# Patient Record
Sex: FEMALE | Race: WHITE
[De-identification: names, ages, dates, MRNs, and addresses within clinical notes are randomized per-mention and may not be internally consistent; named-entity substitution may affect disease eponyms.]

---

## 2017-11-24 ENCOUNTER — HOSPITAL ENCOUNTER (EMERGENCY)
Dept: HOSPITAL 75 - ER | Age: 82
Discharge: HOME | End: 2017-11-24
Payer: MEDICARE

## 2017-11-24 VITALS — BODY MASS INDEX: 25.71 KG/M2 | HEIGHT: 66 IN | WEIGHT: 160 LBS

## 2017-11-24 VITALS — SYSTOLIC BLOOD PRESSURE: 134 MMHG | DIASTOLIC BLOOD PRESSURE: 72 MMHG

## 2017-11-24 DIAGNOSIS — Z90.710: ICD-10-CM

## 2017-11-24 DIAGNOSIS — I26.99: Primary | ICD-10-CM

## 2017-11-24 DIAGNOSIS — Z79.01: ICD-10-CM

## 2017-11-24 DIAGNOSIS — E03.9: ICD-10-CM

## 2017-11-24 DIAGNOSIS — N39.0: ICD-10-CM

## 2017-11-24 LAB
ALBUMIN SERPL-MCNC: 3.5 GM/DL (ref 3.2–4.5)
ALT SERPL-CCNC: 10 U/L (ref 0–55)
AMYLASE SERPL-CCNC: 34 U/L (ref 25–125)
ANION GAP SERPL CALC-SCNC: 11 MMOL/L (ref 5–14)
APTT BLD: 20 SEC (ref 24–35)
AST SERPL-CCNC: 12 U/L (ref 5–34)
BASOPHILS # BLD AUTO: 0 10^3/UL (ref 0–0.1)
BASOPHILS NFR BLD AUTO: 0 % (ref 0–10)
BILIRUB SERPL-MCNC: 0.6 MG/DL (ref 0.1–1)
BILIRUB UR QL STRIP: NEGATIVE
BUN SERPL-MCNC: 20 MG/DL (ref 7–18)
BUN/CREAT SERPL: 22
CALCIUM SERPL-MCNC: 8.9 MG/DL (ref 8.5–10.1)
CHLORIDE SERPL-SCNC: 106 MMOL/L (ref 98–107)
CO2 SERPL-SCNC: 21 MMOL/L (ref 21–32)
CREAT SERPL-MCNC: 0.92 MG/DL (ref 0.6–1.3)
EOSINOPHIL # BLD AUTO: 0.1 10^3/UL (ref 0–0.3)
EOSINOPHIL NFR BLD AUTO: 1 % (ref 0–10)
ERYTHROCYTE [DISTWIDTH] IN BLOOD BY AUTOMATED COUNT: 13.6 % (ref 10–14.5)
GFR SERPLBLD BASED ON 1.73 SQ M-ARVRAT: 57 ML/MIN
GLUCOSE SERPL-MCNC: 115 MG/DL (ref 70–105)
HYALINE CASTS #/AREA URNS LPF: (no result) /LPF
INR PPP: 1 (ref 0.8–1.4)
KETONES UR QL STRIP: (no result)
LEUKOCYTE ESTERASE UR QL STRIP: (no result)
LIPASE SERPL-CCNC: 4 U/L (ref 8–78)
LYMPHOCYTES # BLD AUTO: 1.5 X 10^3 (ref 1–4)
LYMPHOCYTES NFR BLD AUTO: 14 % (ref 12–44)
MAGNESIUM SERPL-MCNC: 2 MG/DL (ref 1.8–2.4)
MCH RBC QN AUTO: 29 PG (ref 25–34)
MCHC RBC AUTO-ENTMCNC: 33 G/DL (ref 32–36)
MCV RBC AUTO: 88 FL (ref 80–99)
MONOCYTES # BLD AUTO: 1 X 10^3 (ref 0–1)
MONOCYTES NFR BLD AUTO: 9 % (ref 0–12)
MYOGLOBIN SERPL-MCNC: 68.5 NG/ML (ref 10–92)
NEUTROPHILS # BLD AUTO: 8.5 X 10^3 (ref 1.8–7.8)
NEUTROPHILS NFR BLD AUTO: 76 % (ref 42–75)
NITRITE UR QL STRIP: NEGATIVE
PH UR STRIP: 5 [PH] (ref 5–9)
PLATELET # BLD: 273 10^3/UL (ref 130–400)
PMV BLD AUTO: 10.5 FL (ref 7.4–10.4)
POTASSIUM SERPL-SCNC: 3.9 MMOL/L (ref 3.6–5)
PROT SERPL-MCNC: 6.9 GM/DL (ref 6.4–8.2)
PROT UR QL STRIP: NEGATIVE
PROTHROMBIN TIME: 13.4 SEC (ref 12.2–14.7)
RBC # BLD AUTO: 4.25 10^6/UL (ref 4.35–5.85)
RENAL EPI CELLS #/AREA URNS HPF: (no result) /HPF
SODIUM SERPL-SCNC: 138 MMOL/L (ref 135–145)
SP GR UR STRIP: 1.01 (ref 1.02–1.02)
SQUAMOUS #/AREA URNS HPF: (no result) /HPF
TROPONIN I SERPL-MCNC: < 0.3 NG/ML (ref ?–0.3)
UROBILINOGEN UR-MCNC: NORMAL MG/DL
WBC # BLD AUTO: 11.2 10^3/UL (ref 4.3–11)

## 2017-11-24 PROCEDURE — 71275 CT ANGIOGRAPHY CHEST: CPT

## 2017-11-24 PROCEDURE — 83735 ASSAY OF MAGNESIUM: CPT

## 2017-11-24 PROCEDURE — 36415 COLL VENOUS BLD VENIPUNCTURE: CPT

## 2017-11-24 PROCEDURE — 85652 RBC SED RATE AUTOMATED: CPT

## 2017-11-24 PROCEDURE — 87088 URINE BACTERIA CULTURE: CPT

## 2017-11-24 PROCEDURE — 85730 THROMBOPLASTIN TIME PARTIAL: CPT

## 2017-11-24 PROCEDURE — 83874 ASSAY OF MYOGLOBIN: CPT

## 2017-11-24 PROCEDURE — 85610 PROTHROMBIN TIME: CPT

## 2017-11-24 PROCEDURE — 82150 ASSAY OF AMYLASE: CPT

## 2017-11-24 PROCEDURE — 83690 ASSAY OF LIPASE: CPT

## 2017-11-24 PROCEDURE — 85025 COMPLETE CBC W/AUTO DIFF WBC: CPT

## 2017-11-24 PROCEDURE — 71010: CPT

## 2017-11-24 PROCEDURE — 80053 COMPREHEN METABOLIC PANEL: CPT

## 2017-11-24 PROCEDURE — 81000 URINALYSIS NONAUTO W/SCOPE: CPT

## 2017-11-24 PROCEDURE — 83880 ASSAY OF NATRIURETIC PEPTIDE: CPT

## 2017-11-24 PROCEDURE — 84484 ASSAY OF TROPONIN QUANT: CPT

## 2017-11-24 PROCEDURE — 93041 RHYTHM ECG TRACING: CPT

## 2017-11-24 NOTE — DIAGNOSTIC IMAGING REPORT
INDICATION: Chest discomfort.



COMPARISON: 12/04/2015.



FINDINGS: Right lower lobe patchy airspace consolidations. A

small right pleural effusion is present. No pneumothorax. Heart

is borderline enlarged.



IMPRESSION:

1. Right basilar patchy opacities may relate to aspiration or

pneumonia.

2. Small right pleural effusion is present.



Dictated by: 



  Dictated on workstation # [id number]

## 2017-11-24 NOTE — ED GENERAL
General


Chief Complaint:  Chest Pain


Stated Complaint:  CHEST DISCOMFORT,LABORED BREATHING


Nursing Triage Note:  


WOKE UP MONDAY AM WITH CHEST PAIN THAT RADIATED INTO HER NECK AND COLLAR BONE.  


ON WED THE PAIN MOVED TO HER BACK.  SEEN AT [facility] OFFICE ON MONDAY AND 

GIVEN 


A SCRIPT FOR SHINGLES AND A UTI.  PT DID NOT START ANTIBIOTIC BECAUSE SHE DOES 


NOT THINK SHE HAS A UTI.  TODAY STATES IT HURTS TO TAKE A DEEP BREATH AND TO 

LAY 


DOWN.


Nursing Sepsis Screen:  No Definite Risk


Source of Information:  Patient (TALKS NON-STOP AT LENGTH), Family (DAUGHTER)





History of Present Illness


Time Seen by Provider:  07:45


Initial Comments


PT ARRIVES VIA POV WITH DAUGHTER (DAUGHTER WORKS IN DR. STEVENSON' OFFICE) 


PT STATES ON MONDAY AT 0300 SHE "HURT ALL OVER" POINTING TO ENTIRE CHEST--

STATES " I COULDN'T WAKE UP -LIKE I WAS HAVING A DREAM" BUT HAD TO SIT UP IN 

CHAIR ALL NIGHT--COULD NOT LAY DOWN DUE TO DISCOMFORT


STATES SHE HAS HAD BURNING PAIN IN HER RIGHT SHOULDER BLADE FOR SEVERAL WEEKS


STATES LAST NIGHT SHE BEGAN HAVING PAIN IN LATERAL RIGHT NECK--WORSE WITH DEEP 

BREATHS--NO PAIN NOW


HAS HAD PAIN IN MID CHEST ALL NIGHT AND COULD NOT SLEEP AT ALL LAST NIGHT AND 

HAD TO SIT UP IN A CHAIR ALL NIGHT


ALSO HAS HAD SHARP/INTERMITTENT PAIN IN RIGHT LOWER/LATERAL AND POSTERIOR RIBS--

WORSE WITH COUGHING


COUGHED A COUPLE OF TIMES YESTERDAY, BUT OTHERWISE NO SIGNIFICANT COUGH. 


NO FEVER


CANNOT STATE IF SHE IS ACTUALLY SHORT OF BREATH OR NOT, BUT DOES NOT THINK SO. 


NO SWELLING IN LEGS/FEET OR PAIN IN CALVES. NO RECENT TRAVEL/PROLONGED SITTING, 

ETC. 


SEEN BY NP AT DR. HUERTA'S OFFICE ON WEDNESDAY FOR THESE PROBLEMS AND WAS 

TOLD SHE HAD SHINGLES AND A UTI--GIVEN RX FOR SHINGLES MEDICATION AND ANTIBIOTIC

--PT DID NOT TAKE ANTIBIOTIC--STATES SHE DOES NOT HAVE UTI SYMPTOMS


SEEN DERMATOLOGIST ON TUESDAY, AND DID NOT FIND ANY PROBLEMS AND NO EVIDENCE OF 

SHINGLES





PT VERY ANXIOUS BECAUSE SHE HAS NEVER HAD ANY REAL MEDICAL PROBLEMS AND NEVER 

BEEN SICK 





PT IS NOT HAVING ANY SYMPTOMS AT THIS TIME





PCP: DR. HUERTA





Allergies and Home Medications


Allergies


Coded Allergies:  


     No Known Drug Allergies (Unverified , 11/24/17)





Home Medications


Acyclovir 800 Mg Tablet, (Reported)


Apixaban 5 Mg Tablet, 10 MG PO BID, #70


   2 TABLETS TWICE DAILY X 7 DAYS, THEN TAKE 1 PILL TWICE A DAY 


   Prescribed by: CASE ALVAREZ on 11/24/17 1048


Cefdinir 300 Mg Capsule, 300 MG PO BID, #20


   Prescribed by: CASE ALVAREZ on 11/24/17 1048


Cephalexin 500 Mg Capsule, (Reported)


Famotidine 40 Mg Tablet, 40 MG PO DAILY, #30


   Prescribed by: CASE ALVAREZ on 11/24/17 1048


Hydrocodone/Acetaminophen 1 Each Tablet, 1 EACH PO Q4H, #20


   Prescribed by: CASE ALVAREZ on 11/24/17 1048


Levothyroxine Sodium 25 Mcg Tablet, (Reported)





Constitutional:  no symptoms reported, No chills, No diaphoresis, No dizziness, 

No fever


EENTM:  no symptoms reported


Respiratory:  see HPI, cough, orthopnea (??), No phlegm, No short of breath, No 

wheezing


Cardiovascular:  see HPI, chest pain, No edema, No palpitations, No syncope


Gastrointestinal:  no symptoms reported, No abdominal pain, No nausea, No 

vomiting


Genitourinary:  no symptoms reported


Musculoskeletal:  see HPI, back pain


Skin:  no symptoms reported, No rash


Psychiatric/Neurological:  See HPI, Anxiety, Denies Headache, Denies Numbness, 

Denies Paresthesia, Denies Seizure, Denies Tingling, Denies Weakness


Hematologic/Lymphatic:  No Symptoms Reported


Immunological/Allergic:  no symptoms reported





Past Medical-Social-Family Hx


Patient Social History


Alcohol Use:  Denies Use


Recreational Drug Use:  No


Smoking Status:  Never a Smoker


Recent Foreign Travel:  No


Contact w/Someone Who Travel:  No


Recent Infectious Disease Expo:  No


Recent Hopitalizations:  No





Surgeries


History of Surgeries:  Yes


Surgeries:  Gallbladder, Hysterectomy





Respiratory


History of Respiratory Disorde:  No





Cardiovascular


History of Cardiac Disorders:  No





Neurological


History of Neurological Disord:  No





Genitourinary


History of Genitourinary Disor:  No





Gastrointestinal


History of Gastrointestinal Di:  No





Musculoskeletal


History of Musculoskeletal Dis:  No





Endocrine


History of Endocrine Disorders:  Yes


Endocrine Disorders:  Hypothyroidsim





HEENT


History of HEENT Disorders:  No





Cancer


History of Cancer:  No





Psychosocial


History of Psychiatric Problem:  No





Integumentary


History of Skin or Integumenta:  No





Physical Exam


Vital Signs





Vital Sign - Last 12Hours








 11/24/17 11/24/17





 07:26 11:35


 


Temp 98.0 


 


Pulse 96 


 


Resp 18 


 


B/P (MAP) 132/64 


 


Pulse Ox 94 


 


O2 Delivery  Room Air





Capillary Refill : Less Than 3 Seconds


General Appearance:  No Apparent Distress, WD/WN, Anxious


HEENT:  PERRL/EOMI, TMs Normal, Normal ENT Inspection, Pharynx Normal


Neck:  Full Range of Motion, Normal Inspection, Non Tender, Supple, No Carotid 

Bruit, No JVD


Respiratory:  Chest Non Tender, Normal Breath Sounds, No Accessory Muscle Use, 

No Respiratory Distress


Cardiovascular:  Regular Rate, Rhythm, No Edema, No JVD, No Murmur, Normal 

Peripheral Pulses


Gastrointestinal:  Normal Bowel Sounds, No Organomegaly, No Pulsatile Mass, Non 

Tender, Soft


Back:  Normal Inspection, No CVA Tenderness, No Vertebral Tenderness


Extremity:  Normal Capillary Refill, Normal Inspection, Normal Range of Motion, 

Non Tender, No Calf Tenderness, No Pedal Edema


Neurologic/Psychiatric:  Alert, Oriented x3, No Motor/Sensory Deficits, CNs II-

XII Norm as Tested


Skin:  Normal Color, Warm/Dry, No Rash





Progress/Results/Core Measures


Suspected Sepsis


Recent Fever Within 48 Hours:  No


Infection Criteria Present:  None


New/Unexplained  Altered Menta:  No


Sepsis Screen:  No Definite Risk


Sepsis Diagnosis:  


SIRS


Temperature:98.0 


Pulse: 96 


Respiratory Rate: 18


 


Laboratory Tests


11/24/17 07:40: White Blood Count 11.2H


Blood Pressure 132 /64 


Mean: 86


 


Laboratory Tests


11/24/17 07:40: 


Creatinine 0.92, Platelet Count 273, Total Bilirubin 0.6


11/24/17 08:16: INR Comment 1.0








Results/Orders


Lab Results





Laboratory Tests








Test


  11/24/17


07:40 11/24/17


08:16 11/24/17


08:30 11/24/17


09:16 Range/Units


 


 


White Blood Count


  11.2 H


  


  


  


  4.3-11.0


10^3/uL


 


Red Blood Count


  4.25 L


  


  


  


  4.35-5.85


10^6/uL


 


Hemoglobin 12.3     11.5-16.0  G/DL


 


Hematocrit 37     35-52  %


 


Mean Corpuscular Volume 88     80-99  FL


 


Mean Corpuscular Hemoglobin 29     25-34  PG


 


Mean Corpuscular Hemoglobin


Concent 33 


  


  


  


  32-36  G/DL


 


 


Red Cell Distribution Width 13.6     10.0-14.5  %


 


Platelet Count


  273 


  


  


  


  130-400


10^3/uL


 


Mean Platelet Volume 10.5 H    7.4-10.4  FL


 


Neutrophils (%) (Auto) 76 H    42-75  %


 


Lymphocytes (%) (Auto) 14     12-44  %


 


Monocytes (%) (Auto) 9     0-12  %


 


Eosinophils (%) (Auto) 1     0-10  %


 


Basophils (%) (Auto) 0     0-10  %


 


Neutrophils # (Auto) 8.5 H    1.8-7.8  X 10^3


 


Lymphocytes # (Auto) 1.5     1.0-4.0  X 10^3


 


Monocytes # (Auto) 1.0     0.0-1.0  X 10^3


 


Eosinophils # (Auto)


  0.1 


  


  


  


  0.0-0.3


10^3/uL


 


Basophils # (Auto)


  0.0 


  


  


  


  0.0-0.1


10^3/uL


 


Sodium Level 138     135-145  MMOL/L


 


Potassium Level 3.9     3.6-5.0  MMOL/L


 


Chloride Level 106       MMOL/L


 


Carbon Dioxide Level 21     21-32  MMOL/L


 


Anion Gap 11     5-14  MMOL/L


 


Blood Urea Nitrogen 20 H    7-18  MG/DL


 


Creatinine


  0.92 


  


  


  


  0.60-1.30


MG/DL


 


Estimat Glomerular Filtration


Rate 57 


  


  


  


   


 


 


BUN/Creatinine Ratio 22      


 


Glucose Level 115 H      MG/DL


 


Calcium Level 8.9     8.5-10.1  MG/DL


 


Magnesium Level 2.0     1.8-2.4  MG/DL


 


Total Bilirubin 0.6     0.1-1.0  MG/DL


 


Aspartate Amino Transf


(AST/SGOT) 12 


  


  


  


  5-34  U/L


 


 


Alanine Aminotransferase


(ALT/SGPT) 10 


  


  


  


  0-55  U/L


 


 


Alkaline Phosphatase 63       U/L


 


Myoglobin


  68.5 


  


  


  


  10.0-92.0


NG/ML


 


Troponin I < 0.30     <0.30  NG/ML


 


B-Type Natriuretic Peptide 34.8     <100.0  PG/ML


 


Total Protein 6.9     6.4-8.2  GM/DL


 


Albumin 3.5     3.2-4.5  GM/DL


 


Amylase Level 34       U/L


 


Lipase 4 L    8-78  U/L


 


Prothrombin Time  13.4    12.2-14.7  SEC


 


INR Comment  1.0    0.8-1.4  


 


Activated Partial


Thromboplast Time 


  20 L


  


  


  24-35  SEC


 


 


Urine Color   YELLOW    


 


Urine Clarity   CLEAR    


 


Urine pH   5   5-9  


 


Urine Specific Gravity   1.015 L  1.016-1.022  


 


Urine Protein   NEGATIVE   NEGATIVE  


 


Urine Glucose (UA)   NEGATIVE   NEGATIVE  


 


Urine Ketones   2+ H  NEGATIVE  


 


Urine Nitrite   NEGATIVE   NEGATIVE  


 


Urine Bilirubin   NEGATIVE   NEGATIVE  


 


Urine Urobilinogen   NORMAL   NORMAL  MG/DL


 


Urine Leukocyte Esterase   1+ H  NEGATIVE  


 


Urine RBC (Auto)   3+ H  NEGATIVE  


 


Urine RBC   NONE    /HPF


 


Urine WBC   10-25 H   /HPF


 


Urine Squamous Epithelial


Cells 


  


  25-50 H


  


   /HPF


 


 


Urine Renal Epithelial Cells   2-5    /HPF


 


Urine Crystals   NONE    /LPF


 


Urine Bacteria   MODERATE H   /HPF


 


Urine Casts   PRESENT    /LPF


 


Urine Hyaline Casts   5-10 H   /LPF


 


Urine Mucus   SMALL H   /LPF


 


Urine Culture Indicated   YES    


 


Erythrocyte Sedimentation Rate    60 H 0-30  MM/HR








Micro Results





Microbiology


11/24/17 Urine Culture - Preliminary, Resulted


           





My Orders





Orders - CASE ALVAREZ DO


Ekg Tracing (11/24/17 07:42)


Continuous Ekg Monitoring (11/24/17 07:42)


Troponin I (11/24/17 07:50)


Chest 1 View, Ap/Pa Only (11/24/17 07:50)


Saline Lock/Iv-Start (11/24/17 07:50)


Monitor-Rhythm Ecg Trace Only (11/24/17 07:50)


Cbc With Automated Diff (11/24/17 07:50)


Magnesium (11/24/17 07:50)


Comprehensive Metabolic Panel (11/24/17 07:50)


Myoglobin Serum (11/24/17 07:50)


Protime With Inr (11/24/17 07:50)


Partial Thromboplastin Time (11/24/17 07:50)


O2 (11/24/17 07:50)


Saline Lock/Iv-Start (11/24/17 07:50)


Lipase (11/24/17 07:50)


Amylase (11/24/17 07:50)


BNP (11/24/17 07:50)


Ua Culture If Indicated (11/24/17 08:17)


Erythrocyte Sedimentation Rate (11/24/17 08:19)


Ct Angio Chest W (11/24/17 08:58)


Urine Culture (11/24/17 08:30)


Iohexol Injection (Omnipaque 350 Mg/Ml 1 (11/24/17 10:00)


Sodium Chloride Flush (Catheter Flush Sy (11/24/17 10:00)


Ns (Ivpb) (Sodium Chloride 0.9% Ivpb Bag (11/24/17 10:00)


Pharmacy Communication (Pharmacy Communi (11/24/17 10:00)


Saline Lock/Iv-Start (11/24/17 10:14)


Ns Iv 1000 Ml (Sodium Chloride 0.9%) (11/24/17 10:14)


Enoxaparin Injection (Lovenox Injection) (11/24/17 10:30)





Medications Given in ED





Vital Signs/I&O


Capillary Refill : Less Than 3 Seconds








Blood Pressure Mean:  86








Progress Note :  


Progress Note


UNEVENTFUL ER STAY


ALL VITALS REMAINED STABLE, O2 SATS IN MID TO UPPER 90'S


NO C/O CHEST DISCOMFORT OR SHORTNESS OF BREATH DURING ER STAY





ECG


Initial ECG Impression Time:  07:34


Initial ECG Rate:  101


Initial ECG Rhythm:  Normal Sinus


Initial ECG Impression:  Nonspecific Changes


Initial ECG Comparisson:  No Previous ECG Available





Diagnostic Imaging





Comments


CXR--RIGHT BASILAR PATCHY INFILTRATE--ASPIRATION OR PNEUMONIA, SMALL RIGHT 

PLEURAL EFFUSION--PER RADIOLOGIST REPORT @ 0850


CT CHEST ANGIOGRAM--P.E. IN RLL, WITH EFFUSION VS ATELECTASIS VS EARLY INFARCT--

PER RADIOLOGIST VIA PHONE AT 1017


   Reviewed:  Reviewed by Me, Discussed w/Radiologist





Departure


Communication (Admissions)


Family Conversation


DAUGHTER STATES THAT IF HER INSURANCE DOES NOT COVER ELOISEIS, THAT SHE CAN GET 

SAMPLES FROM DR. 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


Progress Notes


1023--MESSAGE LEFT ON DR. SNIDER'S CELL


1026--SPOKE WITH DR. SNIDER, ADVISES ELIQUIS AND HAVE PT FOLLOW UP WITH DR. HUERTA ON MONDAY, AS PT DOES NOT MEET ADMISSION CRITERIA AT THIS TIME. PT IS 

ASYMPTOMATIC AND ALL VITALS ARE STABLE


1030--SPOKE WITH DR. HUERTA. SHE WILL SEE PT IN OFFICE ON MONDAY.





Impression


Impression:  


 Primary Impression:  


 Right pulmonary embolus


 Additional Impression:  


 UTI (urinary tract infection)


Disposition:  01 HOME, SELF-CARE


Condition:  Stable





Departure-Patient Inst.


Referrals:  


RONNY HUERTA MD (PCP/Family)


Primary Care Physician


Patient Instructions:  Pulmonary Embolism (Blood Clot in the Lungs) (DC), 

Urinary Tract Infection, Adult (DC)





Add. Discharge Instructions:  


HOME, REST





FOLLOW UP WITH DR. HUERTA ON MONDAY FOR FURTHER CARE





RETURN TO ER IF SYMPTOMS WORSEN





All discharge instructions reviewed with patient and/or family. Voiced 

understanding.


Scripts


Famotidine (Pepcid) 40 Mg Tablet


40 MG PO DAILY, #30 TAB


   Prov: CASE ALVAREZ DO         11/24/17 


Cefdinir (Cefdinir) 300 Mg Capsule


300 MG PO BID for FOR INFECTION, #20 CAP


   Prov: CASE ALVAREZ DO         11/24/17 


Hydrocodone/Acetaminophen (Hydrocodon -Acetaminophen 5-325) 1 Each Tablet


1 EACH PO Q4H, #20 TAB


   Prov: CASE ALVAREZ DO         11/24/17 


Apixaban (Eliquis) 5 Mg Tablet


10 MG PO BID, #70 TAB


   2 TABLETS TWICE DAILY X 7 DAYS, THEN TAKE 1 PILL TWICE A DAY


   Prov: CASE ALVAREZ DO         11/24/17











CASE ALVAREZ DO Nov 24, 2017 08:50

## 2017-11-24 NOTE — DIAGNOSTIC IMAGING REPORT
PROCEDURE: CT angiography of the chest with contrast.



TECHNIQUE: Multiple contiguous axial images were obtained through

the chest after uneventful bolus administration of intravenous

contrast. Reconstructed CTA MIP acquisitions were also performed.

 

INDICATION: Chest pain. Possible pulmonary embolus.



COMPARISON: None



FINDINGS: 

There is a large intraluminal thrombus seen within the right

lower lobe segmental arteries particularly the posterior medial

branches. No additional embolus is seen. There is a small right

pleural effusion. There is mild right basilar atelectasis or

infiltrate. There is mild bronchiectasis noted particularly to

the lower lobes bilaterally. There is pleural-parenchymal

scarring in the lung apices. There is no pneumothorax. There is

no adenopathy. There is a small hiatal hernia. No acute

abnormalities seen in the visualized abdomen. No acute osseous

abnormality is suspected



IMPRESSION:

1. There is pulmonary embolus to segmental basilar branches of

the right lower lobe. There is a small right pleural effusion

with right basilar airspace disease, possibility of atelectasis,

infiltrate or developing infarct.

2. Mild bilateral bronchiectasis, particularly to the lower

lobes. Pleural parenchymal scarring in the lung apices

bilaterally.

3. Small hiatal hernia.



Findings were phoned to Dr. Vences at 10:19 AM on 11/24/2017 by Dr. Angeles Trammell



Dictated by: 



  Dictated on workstation # [id number]

## 2017-11-29 ENCOUNTER — HOSPITAL ENCOUNTER (OUTPATIENT)
Dept: HOSPITAL 75 - RAD | Age: 82
End: 2017-11-29
Attending: NURSE PRACTITIONER
Payer: MEDICARE

## 2017-11-29 DIAGNOSIS — E78.2: ICD-10-CM

## 2017-11-29 DIAGNOSIS — M79.605: Primary | ICD-10-CM

## 2017-11-29 DIAGNOSIS — I65.23: ICD-10-CM

## 2017-11-29 PROCEDURE — 93880 EXTRACRANIAL BILAT STUDY: CPT

## 2017-11-29 NOTE — DIAGNOSTIC IMAGING REPORT
PROCEDURE: US Carotid Duplex Bilateral.



TECHNIQUE: Multiple real-time grayscale images were obtained over

the carotid arteries in various projections bilaterally.

Additional duplex Doppler and color Doppler images were also

obtained.



INDICATION: Right neck pain.



FINDINGS: There is minimal atherosclerotic plaque seen in the

carotid arteries on grayscale images. Color Doppler demonstrates

patency of the common, internal and external carotid arteries.

There is antegrade flow noted in the vertebral arteries

bilaterally.



Peak systolic velocities in the right ICA are 87, 79, and 64 cm/s

and on the left side 83, 115, and 94 cm/s. ICA/CCA ratios are up

to 1.2 on the right and up to 1 on the left.



IMPRESSION: Mild atherosclerotic plaque in the carotid arteries

with estimated underlying stenosis in the range of 0-40%

bilaterally.



Dictated by: 



  Dictated on workstation # [id number]

## 2017-11-29 NOTE — DIAGNOSTIC IMAGING REPORT
EXAMINATION: Left lower extremity duplex venous ultrasound.



TECHNIQUE: DVT protocol. Multiple sonographic images with color

Doppler and waveform interrogation were performed of the left

lower extremity veins with compression and augmentation

maneuvers.



INDICATION: Left leg pain.



FINDINGS: The left lower extremity veins from the groin to below

the knee veins were examined with normal color-flow,

compressibility and waveform demonstrated. The great saphenous

vein is patent.

     



IMPRESSION: 

No evidence of DVT in the left lower extremity.    









Dictated by: 



  Dictated on workstation # [id number]

## 2017-12-01 ENCOUNTER — HOSPITAL ENCOUNTER (OUTPATIENT)
Dept: HOSPITAL 75 - RAD | Age: 82
End: 2017-12-01
Attending: INTERNAL MEDICINE
Payer: MEDICARE

## 2017-12-01 ENCOUNTER — HOSPITAL ENCOUNTER (OUTPATIENT)
Dept: HOSPITAL 75 - CARD | Age: 82
End: 2017-12-01
Attending: NURSE PRACTITIONER
Payer: MEDICARE

## 2017-12-01 DIAGNOSIS — Z86.711: ICD-10-CM

## 2017-12-01 DIAGNOSIS — M79.604: Primary | ICD-10-CM

## 2017-12-01 DIAGNOSIS — I26.99: Primary | ICD-10-CM

## 2017-12-01 PROCEDURE — 93306 TTE W/DOPPLER COMPLETE: CPT

## 2017-12-01 NOTE — DIAGNOSTIC IMAGING REPORT
PROCEDURE: US right lower extremity venous.



TECHNIQUE: Multiple real-time grayscale images were obtained over

the right lower extremity in various projections. Additional

duplex Doppler and color Doppler images were also obtained.



INDICATION: History of recent PE. Right leg pain.



COMPARISON: 11/29/2017.



FINDINGS: Color Doppler imaging shows normal color flow

enhancement from external iliac vein throughout the lower

extremity on the right to the ankle. Calf compression shows

normal augmentation of flow at the popliteal level. No evidence

of popliteal cyst.



IMPRESSION: No evidence of developing venous thrombosis right

lower extremity.



Dictated by: 



  Dictated on workstation # [id number]

## 2017-12-04 ENCOUNTER — HOSPITAL ENCOUNTER (OUTPATIENT)
Dept: HOSPITAL 75 - CARD | Age: 82
End: 2017-12-04
Attending: INTERNAL MEDICINE
Payer: MEDICARE

## 2017-12-04 DIAGNOSIS — I26.99: Primary | ICD-10-CM

## 2017-12-04 DIAGNOSIS — R07.89: ICD-10-CM

## 2017-12-04 DIAGNOSIS — Z86.39: ICD-10-CM

## 2017-12-04 PROCEDURE — 93225 XTRNL ECG REC<48 HRS REC: CPT

## 2017-12-04 PROCEDURE — 93226 XTRNL ECG REC<48 HR SCAN A/R: CPT

## 2017-12-20 ENCOUNTER — HOSPITAL ENCOUNTER (OUTPATIENT)
Dept: HOSPITAL 75 - RAD | Age: 82
End: 2017-12-20
Attending: INTERNAL MEDICINE
Payer: MEDICARE

## 2017-12-20 DIAGNOSIS — Z12.31: Primary | ICD-10-CM

## 2017-12-20 DIAGNOSIS — I26.99: ICD-10-CM

## 2017-12-20 PROCEDURE — 77067 SCR MAMMO BI INCL CAD: CPT

## 2017-12-21 NOTE — DIAGNOSTIC IMAGING REPORT
Bilateral screening mammogram 2D views with tomosynthesis



The current study was also evaluated with a Computer Aided

Detection (CAD) system.



Indication: Screening. No current complaints stated on the

questionnaire.



COMPARISON: 10/15/2015.



Findings:

The breasts are composed of scattered fibroglandular densities.

There are occasional benign-appearing calcifications Allowing for

technique and positional differences, no suspicious change is

seen.



IMPRESSION: No significant change.



ACR BI-RADS Category 2: Benign findings.

Result letter will be mailed to the patient.

Note: At least 10% of breast cancer is not imaged by mammography.



 



Dictated by: 



  Dictated on workstation # [id number]

## 2018-01-05 ENCOUNTER — HOSPITAL ENCOUNTER (OUTPATIENT)
Dept: HOSPITAL 75 - PREOP | Age: 83
End: 2018-01-05
Attending: INTERNAL MEDICINE
Payer: MEDICARE

## 2018-01-05 VITALS — HEIGHT: 66 IN | WEIGHT: 160 LBS | BODY MASS INDEX: 25.71 KG/M2

## 2018-01-05 DIAGNOSIS — Z12.11: ICD-10-CM

## 2018-01-05 DIAGNOSIS — Z01.818: Primary | ICD-10-CM

## 2018-01-12 ENCOUNTER — HOSPITAL ENCOUNTER (OUTPATIENT)
Dept: HOSPITAL 75 - ENDO | Age: 83
Discharge: HOME | End: 2018-01-12
Attending: INTERNAL MEDICINE
Payer: MEDICARE

## 2018-01-12 VITALS — WEIGHT: 160 LBS | HEIGHT: 66 IN | BODY MASS INDEX: 25.71 KG/M2

## 2018-01-12 VITALS — SYSTOLIC BLOOD PRESSURE: 132 MMHG | DIASTOLIC BLOOD PRESSURE: 64 MMHG

## 2018-01-12 VITALS — DIASTOLIC BLOOD PRESSURE: 73 MMHG | SYSTOLIC BLOOD PRESSURE: 128 MMHG

## 2018-01-12 VITALS — SYSTOLIC BLOOD PRESSURE: 116 MMHG | DIASTOLIC BLOOD PRESSURE: 50 MMHG

## 2018-01-12 DIAGNOSIS — E03.9: ICD-10-CM

## 2018-01-12 DIAGNOSIS — K57.20: ICD-10-CM

## 2018-01-12 DIAGNOSIS — Z12.11: Primary | ICD-10-CM

## 2018-01-12 DIAGNOSIS — Z79.899: ICD-10-CM

## 2018-01-12 DIAGNOSIS — K63.5: ICD-10-CM

## 2018-01-12 DIAGNOSIS — Z79.01: ICD-10-CM

## 2018-01-12 DIAGNOSIS — Z86.711: ICD-10-CM

## 2018-01-12 NOTE — PRE-OP NOTE & CONSCIOUS SEDAT
Pre-Operative Progress Note


H&P Reviewed


The H&P was reviewed, patient examined and no changes noted.


Date H&P Reviewed:  Jan 12, 2018


Time H&P Reviewed:  07:54





Conscious Sedation Pre-Proced


ASA Class:  2











Airway Mallampati Classification: (Ione appropriate class) I.  II.  III,  IV


 


Lungs 


 


Heart 


 


 ASA score


 


 ASA 1: a normal healthy patient


 


 ASA 2:  a patient with a mild systemic disease (mid diabetes, controlled 

hypertension, obesity 


 


 ASA 3:  a patient with a severe systemic disease that limits activity  (angina

, COPD, prior Myocardial infarction)


 


 ASA 4:  a patient with an incapacitating disease that is a constant threat to 

life (CHF, renal failure)


 


 ASA 5:  a moribund patient not expected to survive 24 hrs.  (ruptured aneurysm)


 


 ASA 6:  a declared brain dead patient whose organs are being harvested.


 


 For emergent operations, add the letter E after the classification








Grade 2


Sedation Plan:  Analgesia, Amnesia, Plan communicated to team members, 

Discussed options with patient/fam, Discussed risks with patient/fam


Note


The patient is an appropriate candidate to undergo the planned procedure, 

sedation, and anesthesia.





The patient immediately re-assessed prior to indication.











ZHEN STEVENSON MD Jan 12, 2018 07:55

## 2018-01-12 NOTE — OPERATIVE REPORT
DATE OF SERVICE:  



COLONOSCOPY SUMMARY



INDICATION FOR THE PROCEDURE:

Screening colon.



The patient is referred for screening colonoscopy.  She is deemed to be of

higher than average risk as she has recently been diagnosed with pulmonary

embolism, unprovoked.



DESCRIPTION OF PROCEDURE:

The patient was placed in left lateral decubitus position.  Prior to undergoing

colonoscopy, digital rectal evaluation was performed.  Anal sphincter tone was

normal and the perianal reflex is intact.  No abnormalities, no additional

inspection of anal canal or distal rectal vault.



The colonoscope was then inserted into the rectum and under direct visualization

advanced to the cecum.  The cecum was identified by identification of ileocecal

valve and cecal strap.  Photographic documentation was obtained.  Careful

inspection was made.  The patient tolerated procedure well.



FINDINGS:

There was no evidence for internal or external hemorrhoids and the rectum was

unremarkable.  A few small sigmoid diverticulum were present without evidence

for diverticulitis.  Mild haustral hypertrophy is present.  The descending colon

was unremarkable.  Present in the distal transverse colon was a diminutive

hyperplastic appearing polyp; due to this patient's advanced age and recent

pulmonary embolism with need for anticoagulant therapy, it was left.  The

remainder of the transverse colon, hepatic flexure, ascending colon and cecum

were unremarkable.



ASSESSMENT:

1.  Mild diverticular disease confined to the sigmoid colon was present without

evidence for diverticulitis.

2.  A diminutive hyperplastic polyp was noted in the distal transverse colon. 

Considering recent pulmonary embolism with need for ongoing anticoagulant

therapy and advanced age, risk of bleeding were outweighed by any potential

benefit for this patient, so it was left.  I would not advocate future screening

colonoscopy.



I thank you for the referral of this pleasant lady, reassured by today's

findings.





Job ID: [id number]

DocumentID: [id number]

Dictated Date:  01/12/2018 09:28:36

Transcription Date: 01/12/2018 14:02:20

Dictated By: ZHEN STEVENSON MD

## 2018-01-12 NOTE — XMS REPORT
Continuity of Care Document

 Created on: 2018



KIRSTIN ZAPIEN

External Reference #: [id number]

: 1928

Sex: Female



Demographics







 Address  [address] [place] 

[place]  [address]

 

 Home Phone  [phone] x

 

 Preferred Language  Unknown

 

 Marital Status  Unknown

 

 Taoism Affiliation  Unknown

 

 Race  Unknown

 

 Ethnic Group  Unknown





Author







 Author  [facility]

 

 Organization  [facility]

 

 Address  Unknown

 

 Phone  Unavailable



              



Allergies

      





 Active            Description            Code            Type            
Severity            Reaction            Onset            Reported/Identified   
         Relationship to Patient            Clinical Status        

 

 Yes            No Known Drug Allergies            [id number]            Drug 
Allergy            Unknown            N/A                         2017   
                               



                  



Medications

      



There is no data.                  



Problems

      





 Date Dx Coded            Attending            Type            Code            
Diagnosis            Diagnosed By        

 

 2014            RONNY HUERTA MD            Ot            724.3     
       SCIATICA                     

 

 2014            RONNY HUERTA MD            Ot            729.5     
       PAIN IN LIMB                     

 

 2014            RONNY HUERTA MD            Ot            V57.1     
       PHYSICAL THERAPY NEC                     

 

 10/15/2015                         Ot            V76.12                       
           

 

 2015            PABLITO CROWLEY ARNP            Ot            E66.9 
                                 

 

 2015            PABLITO CROWLEY ARNP            Ot            M81.0 
                                 

 

 2015            PABLITO CROWLEY ARNP            Ot            E66.9 
                                 

 

 2015            PABLITO CROWLEY ARNP            Ot            M81.0 
                                 

 

 2015            PABLITO CROWLEYP            Ot            Z12.31
                                  

 

 2015            PABLITO CROWLEYP            Ot            E66.9 
                                 

 

 2015            PABLITO CROWLEY ARNP            Ot            M81.0 
                                 

 

 2017            PABLITO CROWLEY ARNP            Ot            Z12.31
            ENCNTR SCREEN MAMMOGRAM FOR MALIGNANT NE                     

 

 2017            PABLITO CROWLEY ARNP            Ot            E66.9 
           OBESITY, UNSPECIFIED                     

 

 2017            PABLITO CROWLEY ARNP            Ot            M81.0 
           AGE-RELATED OSTEOPOROSIS W/O CURRENT PAT                     

 

 2017            CASE AVLAREZ DO            Ot            E03.9          
  HYPOTHYROIDISM, UNSPECIFIED                     

 

 2017            FRANCA ALVAREZ DOA K            Ot            I26.99         
   OTHER PULMONARY EMBOLISM WITHOUT ACUTE C                     

 

 2017            CASE ALVAREZ DO            Ot            N39.0          
  URINARY TRACT INFECTION, SITE NOT SPECIF                     

 

 2017            CASE ALVAREZ DO K            Ot            R07.81         
   PLEURODYNIA                     

 

 2017            ANTONIO FRANCA MICHAELA K            Ot            Z79.01         
   LONG TERM (CURRENT) USE OF ANTICOAGULANT                     

 

 2017            ANTONIO DO CASE K            Ot            Z90.710        
    ACQUIRED ABSENCE OF BOTH CERVIX AND UTER                     

 

 2017            PABLITO CROWLEY ARNP            Ot            Z12.31
            ENCNTR SCREEN MAMMOGRAM FOR MALIGNANT NE                     

 

 2017            PABLITO CROWLEYP            Ot            E66.9 
           OBESITY, UNSPECIFIED                     

 

 2017            PABLITO CROWLEYP            Ot            M81.0 
           AGE-RELATED OSTEOPOROSIS W/O CURRENT PAT                     

 

 2017            YAKELIN PEGUERO APRN            Ot            E78.2     
       MIXED HYPERLIPIDEMIA                     

 

 2017            YAKELIN PEGUERO APRN            Ot            I65.23    
        OCCLUSION AND STENOSIS OF BILATERAL SANTOS                     

 

 2017            YAKELIN PEGUERO            Ot            M79.605   
         PAIN IN LEFT LEG                     

 

 2017            ANTONIO FRANCA MICHAELA K            Ot            E03.9          
  HYPOTHYROIDISM, UNSPECIFIED                     

 

 2017            ANTONIO FRANCA MICHAELA K            Ot            I26.99         
   OTHER PULMONARY EMBOLISM WITHOUT ACUTE C                     

 

 2017            ANTONIO FRANCA MICHAELA K            Ot            N39.0          
  URINARY TRACT INFECTION, SITE NOT SPECIF                     

 

 2017            ANTONIO FRANCA MICHAELA K            Ot            R07.81         
   PLEURODYNIA                     

 

 2017            ANTONIO FRANCA MICHAELA K            Ot            Z79.01         
   LONG TERM (CURRENT) USE OF ANTICOAGULANT                     

 

 2017            ANTONIO DO CASE K            Ot            Z90.710        
    ACQUIRED ABSENCE OF BOTH CERVIX AND UTER                     

 

 2017            BRUNO ROLDAN FACC, ALI FACP CCDS            Ot            
R07.89            OTHER CHEST PAIN                     

 

 2017            BRUNO ROLDAN FACC, ALI FACP CCDS            Ot            
R07.89            OTHER CHEST PAIN                     

 

 2017            PABLITO CROWLEY ARNP            Ot            Z12.31
            ENCNTR SCREEN MAMMOGRAM FOR MALIGNANT NE                     

 

 2017            PABLITO CROWLEYP            Ot            E66.9 
           OBESITY, UNSPECIFIED                     

 

 2017            PABLITO CROWLEY ARNP            Ot            M81.0 
           AGE-RELATED OSTEOPOROSIS W/O CURRENT PAT                     

 

 2017            YAKELIN PEGUERO APRN            Ot            E78.2     
       MIXED HYPERLIPIDEMIA                     

 

 2017            YAKELIN PEGUERO APRN            Ot            I65.23    
        OCCLUSION AND STENOSIS OF BILATERAL SANTOS                     

 

 2017            YAKELIN PEGUERO            Ot            M79.605   
         PAIN IN LEFT LEG                     

 

 2017            BRUNO ROLDAN FACC, ALI FACP CCDS            Ot            
I26.99            OTHER PULMONARY EMBOLISM WITHOUT ACUTE C                     

 

 2017            BRUNO ROLDAN FACC, ALI FACP CCDS            Ot            
R07.89            OTHER CHEST PAIN                     

 

 2017            YAKELIN PGEUERO APRN            Ot            I26.99    
        OTHER PULMONARY EMBOLISM WITHOUT ACUTE C                     

 

 12/10/2017            BRUNO ROLDAN FACC, ALI FACP CCDS            Ot            
I26.99            OTHER PULMONARY EMBOLISM WITHOUT ACUTE C                     

 

 12/10/2017            BRUNO ROLDAN FACC, ALI FACP CCDS            Ot            
R07.89            OTHER CHEST PAIN                     

 

 12/10/2017            BRUNO ROLDAN FACC, ALI FACP CCDS            Ot            
Z86.39            PERSONAL HISTORY OF ENDO, NUTRITIONAL AN                     

 

 2017            SOPHY MAYFIELD MD            Ot            E03.9          
  HYPOTHYROIDISM, UNSPECIFIED                     

 

 2017            SOPHY MAYFIELD MD            Ot            I26.99         
   OTHER PULMONARY EMBOLISM WITHOUT ACUTE C                     

 

 2017            SOPHY MAYIFELD MD            Ot            M19.91         
   PRIMARY OSTEOARTHRITIS, UNSPECIFIED SITE                     

 

 2017            SOPHY MAYFIELD MD            Ot            Z79.01         
   LONG TERM (CURRENT) USE OF ANTICOAGULANT                     

 

 2017            SOPHY MAYFIELD MD            Ot            Z79.899        
    OTHER LONG TERM (CURRENT) DRUG THERAPY                     

 

 2017            YAKELIN PEGUERO            Ot            E78.2     
       MIXED HYPERLIPIDEMIA                     

 

 2017            YAKELIN PEGUERO            Ot            I65.23    
        OCCLUSION AND STENOSIS OF BILATERAL SANTOS                     

 

 2017            YAKELIN PEGUERO            Ot            M79.605   
         PAIN IN LEFT LEG                     

 

 2017            YAKELIN PEGUERO            Ot            I26.99    
        OTHER PULMONARY EMBOLISM WITHOUT ACUTE C                     

 

 2017            BRUNO ROLDAN FACC, ALI FACP CCDS            Ot            
I26.99            OTHER PULMONARY EMBOLISM WITHOUT ACUTE C                     

 

 2017            BRUNO ROLDAN FACC, ALI FACP CCDS            Ot            
R07.89            OTHER CHEST PAIN                     

 

 2017            BRUNO ROLDAN FACC, ALI FACP CCDS            Ot            
Z86.39            PERSONAL HISTORY OF ENDO, NUTRITIONAL AN                     

 

 2017            YAKELIN PEGUERO            Ot            E78.2     
       MIXED HYPERLIPIDEMIA                     

 

 2017            YAKELIN PEGUERO            Ot            I65.23    
        OCCLUSION AND STENOSIS OF BILATERAL SANTOS                     

 

 2017            YAKELIN PEGUERO            Ot            M79.605   
         PAIN IN LEFT LEG                     

 

 2017            BRUNO MD FACC, ALI FACP CCDS            Ot            
M79.604            PAIN IN RIGHT LEG                     

 

 2017            BRUNO ROLDAN FACC, ALI FACP CCDS            Ot            
Z86.711            PERSONAL HISTORY OF PULMONARY EMBOLISM                     

 

 2018            SUDHIRYAKELIN            Ot            I26.99    
        OTHER PULMONARY EMBOLISM WITHOUT ACUTE C                     

 

 2018            BRUNO MD FACC, ALI FACP CCDS            Ot            
I26.99            OTHER PULMONARY EMBOLISM WITHOUT ACUTE C                     

 

 2018            BRUNO ROLDAN FACC, ALI FACP CCDS            Ot            
R07.89            OTHER CHEST PAIN                     

 

 2018            BRUNO ROLDAN FACC, ALI FACP CCDS            Ot            
Z86.39            PERSONAL HISTORY OF ENDO, NUTRITIONAL AN                     

 

 2018            ZHEN STEVENSON MD            Ot            Z01.818     
       ENCOUNTER FOR OTHER PREPROCEDURAL EXAMIN                     

 

 2018            ZHEN STEVENSON MD            Ot            Z12.11      
      ENCOUNTER FOR SCREENING FOR MALIGNANT NE                     

 

 01/10/2018            SOPHY MAYFIELD MD            Ot            I26.99         
   OTHER PULMONARY EMBOLISM WITHOUT ACUTE C                     

 

 01/10/2018            SOPHY MAYFIELD MD            Ot            Z12.31         
   ENCNTR SCREEN MAMMOGRAM FOR MALIGNANT NE                     

 

 2018            BRUNO ROLDAN FACJL, ALI FACP CCDS            Ot            
M79.604            PAIN IN RIGHT LEG                     

 

 2018            BRUNO ROLDAN FACC, ALI FACP CCDS            Ot            
Z86.711            PERSONAL HISTORY OF PULMONARY EMBOLISM                     



                                                                               
                                                                               
    



Procedures

      



There is no data.                  



Results

      





 Test            Result            Range        









 Complete blood count (CBC) with automated white blood cell (WBC) differential 
- 17 07:40         









 Blood leukocytes automated count (number/volume)            11.2 10*3/uL      
      4.3-11.0        

 

 Blood erythrocytes automated count (number/volume)            4.25 10*6/uL    
        4.35-5.85        

 

 Venous blood hemoglobin measurement (mass/volume)            12.3 g/dL        
    11.5-16.0        

 

 Blood hematocrit (volume fraction)            37 %            35-52        

 

 Automated erythrocyte mean corpuscular volume            88 [foz_us]          
  80-99        

 

 Automated erythrocyte mean corpuscular hemoglobin (mass per erythrocyte)      
      29 pg            25-34        

 

 Automated erythrocyte mean corpuscular hemoglobin concentration measurement (
mass/volume)            33 g/dL            32-36        

 

 Automated erythrocyte distribution width ratio            13.6 %            
10.0-14.5        

 

 Automated blood platelet count (count/volume)            273 10*3/uL          
  130-400        

 

 Automated blood platelet mean volume measurement            10.5 [foz_us]     
       7.4-10.4        

 

 Automated blood neutrophils/100 leukocytes            76 %            42-75   
     

 

 Automated blood lymphocytes/100 leukocytes            14 %            12-44   
     

 

 Blood monocytes/100 leukocytes            9 %            0-12        

 

 Automated blood eosinophils/100 leukocytes            1 %            0-10     
   

 

 Automated blood basophils/100 leukocytes            0 %            0-10        

 

 Blood neutrophils automated count (number/volume)            8.5 10*3         
   1.8-7.8        

 

 Blood lymphocytes automated count (number/volume)            1.5 10*3         
   1.0-4.0        

 

 Blood monocytes automated count (number/volume)            1.0 10*3            
0.0-1.0        

 

 Automated eosinophil count            0.1 10*3/uL            0.0-0.3        

 

 Automated blood basophil count (count/volume)            0.0 10*3/uL          
  0.0-0.1        









 Comprehensive metabolic panel - 17 07:40         









 Serum or plasma sodium measurement (moles/volume)            138 mmol/L       
     135-145        

 

 Serum or plasma potassium measurement (moles/volume)            3.9 mmol/L    
        3.6-5.0        

 

 Serum or plasma chloride measurement (moles/volume)            106 mmol/L     
               

 

 Carbon dioxide            21 mmol/L            21-32        

 

 Serum or plasma anion gap determination (moles/volume)            11 mmol/L   
         5-14        

 

 Serum or plasma urea nitrogen measurement (mass/volume)            20 mg/dL   
         7-18        

 

 Serum or plasma creatinine measurement (mass/volume)            0.92 mg/dL    
        0.60-1.30        

 

 Serum or plasma urea nitrogen/creatinine mass ratio            22             
NRG        

 

 Serum or plasma creatinine measurement with calculation of estimated 
glomerular filtration rate            57             NRG        

 

 Serum or plasma glucose measurement (mass/volume)            115 mg/dL        
            

 

 Serum or plasma calcium measurement (mass/volume)            8.9 mg/dL        
    8.5-10.1        

 

 Serum or plasma total bilirubin measurement (mass/volume)            0.6 mg/dL
            0.1-1.0        

 

 Serum or plasma alkaline phosphatase measurement (enzymatic activity/volume)  
          63 U/L                    

 

 Serum or plasma aspartate aminotransferase measurement (enzymatic activity/
volume)            12 U/L            5-34        

 

 Serum or plasma alanine aminotransferase measurement (enzymatic activity/volume
)            10 U/L            0-55        

 

 Serum or plasma protein measurement (mass/volume)            6.9 g/dL         
   6.4-8.2        

 

 Serum or plasma albumin measurement (mass/volume)            3.5 g/dL         
   3.2-4.5        









 Magnesium - 17 07:40         









 Magnesium            2.0 mg/dL            1.8-2.4        









 Serum or plasma troponin i.cardiac measurement (mass/volume) - 17 07:40 
        









 Serum or plasma troponin i.cardiac measurement (mass/volume)            < ng/
mL            <0.30        









 Myoglobin, serum - 17 07:40         









 Myoglobin, serum            68.5 ng/mL            10.0-92.0        









 Serum or plasma amylase measurement (enzymatic activity/volume) - 17 07:
40         









 Serum or plasma amylase measurement (enzymatic activity/volume)            34 U
/L                    









 Serum or plasma lithium measurement (moles/volume) - 17 07:40         









 BNP level            34.8 pg/mL            <100.0        









 Lipase - 17 07:40         









 Lipase            4 U/L            8-78        









 PT panel in platelet poor plasma by coagulation assay - 17 08:16         









 Prothrombin time (PT) in platelet poor plasma by coagulation assay            
13.4 s            12.2-14.7        

 

 INR in platelet poor plasma or blood by coagulation assay            1.0      
       0.8-1.4        









 Activated partial thromboplastin time (aPTT) in platelet poor plasma 
bycoagulation assay - 17 08:16         









 Activated partial thromboplastin time (aPTT) in platelet poor plasma 
bycoagulation assay            20 s            24-35        









 Complete urinalysis with reflex to culture - 17 08:30         









 Urine color determination            YELLOW             NRG        

 

 Urine clarity determination            CLEAR             NRG        

 

 Urine pH measurement by test strip            5             5-9        

 

 Specific gravity of urine by test strip            1.015             1.016-
1.022        

 

 Urine protein assay by test strip, semi-quantitative            NEGATIVE      
       NEGATIVE        

 

 Urine glucose detection by automated test strip            NEGATIVE           
  NEGATIVE        

 

 Erythrocytes detection in urine sediment by light microscopy            3+    
         NEGATIVE        

 

 Urine ketones detection by automated test strip            2+             
NEGATIVE        

 

 Urine nitrite detection by test strip            NEGATIVE             NEGATIVE
        

 

 Urine total bilirubin detection by test strip            NEGATIVE             
NEGATIVE        

 

 Urine urobilinogen measurement by automated test strip (mass/volume)          
  NORMAL             NORMAL        

 

 Urine leukocyte esterase detection by dipstick            1+             
NEGATIVE        

 

 Automated urine sediment erythrocyte count by microscopy (number/high power 
field)            NONE             NRG        

 

 Automated urine sediment leukocyte count by microscopy (number/high power field
)             [HPF]            NRG        

 

 Bacteria detection in urine sediment by light microscopy            MODERATE  
           NRG        

 

 Squamous epithelial cells detection in urine sediment by light microscopy     
       25-50             NRG        

 

 Crystals detection in urine sediment by light microscopy            NONE      
       NRG        

 

 Casts detection in urine sediment by light microscopy            PRESENT      
       NRG        

 

 Mucus detection in urine sediment by light microscopy            SMALL        
     NRG        

 

 Complete urinalysis with reflex to culture            YES             NRG     
   

 

 Hyaline casts detection in urine sediment by light microscopy            5-10 
            NRG        

 

 Renal epithelial cells detection in urine sediment by light microscopy        
    2-5             NRG        









 Bacterial urine culture - 17 08:30         









 URINE CULTURE RESULTS            <10,000/ML             NRG        









 Erythrocyte sedimentation rate by westergren method - 17 09:16         









 Erythrocyte sedimentation rate by westergren method            60 mm          
  0-30        



                                        



Encounters

      





 ACCT No.            Visit Date/Time            Discharge            Status    
        Pt. Type            Provider            Facility            Loc./Unit  
          Complaint        

 

 [id number]            2018 05:29:00            2018 14:57:00    
        DIS            Outpatient            ZHEN STEVENSON MD            [facility]            PREOP            COLONOSCOPY        

 

 [id number]            2017 08:51:00            2017 23:59:59    
        CLS            Outpatient            SOPHY MAYFIELD MD            [facility]            RAD            I26.99 PULMONARY 
EMBOLISM ON RIGHT        

 

 [id number]            2017 08:23:00            2017 23:59:59    
        CLS            Outpatient            SOPHY MAYFIELD MD            [facility]            ONC                     

 

 [id number]            2017 11:37:00            2017 23:59:59    
        CLS            Outpatient            ROXANN CARR MD, FACC, FACP CCDS     
       [facility]            CARD            CHEST 
DISCOMFORT        

 

 [id number]            2017 11:00:00            2017 23:59:59    
        CLS            Outpatient            ROXANN CARR MD, FACC, FACP CCDS     
       [facility]            RAD            PULMONARY 
EMBOLISM ON RIGHT        

 

 [id number]            2017 10:59:00            2017 23:59:59    
        CLS            Outpatient            YAKELIN PEGUERO            
[facility]            CARD            I26.99        

 

 [id number]            2017 09:38:00            2017 23:59:59    
        CLS            Outpatient            YAKELIN PEGUERO            
[facility]            RAD            E78.2        

 

 [id number]            2017 07:27:00            2017 11:35:00    
        DIS            Emergency            ANTONIO CASE MICHAEL            [facility]            ER            CHEST DISCOMFORT,LABORED 
BREATHING        

 

 [id number]            2015 17:20:00            2015 23:59:59    
        CLS            Outpatient            COLTHARP RYAN MICHAEL            [facility]            QUICK                     

 

 [id number]            10/29/2015 11:40:00            10/29/2015 23:59:59    
        CLS            Outpatient            PABLITO CROWLEYP          
  [facility]            RAD            OSTEOPOROSIS      
  

 

 [id number]            10/15/2015 10:53:00            10/15/2015 23:59:59    
        CLS            Outpatient            PABLITO CROWLEY ARNP          
  [facility]            RAD            SCREENING        

 

 [id number]            2014 08:34:00            2014 11:00:00    
        DIS            Outpatient            RONNY HUERTA MD            
[facility]            REHAB            SCIATICA,LOWER LEG 
PAIN L LEG        

 

 [id number]            2018 08:00:00                         PEN       
     PreadZHEN Magana MD            [facility]            ENDO            SCREENING        

 

 [id number]            2011 10:31:00                                   
   Document Registration

## 2018-03-02 ENCOUNTER — HOSPITAL ENCOUNTER (OUTPATIENT)
Dept: HOSPITAL 75 - ONC | Age: 83
LOS: 6 days | Discharge: HOME | End: 2018-03-08
Attending: INTERNAL MEDICINE
Payer: MEDICARE

## 2018-03-02 DIAGNOSIS — E03.9: ICD-10-CM

## 2018-03-02 DIAGNOSIS — M19.91: ICD-10-CM

## 2018-03-02 DIAGNOSIS — I26.99: Primary | ICD-10-CM

## 2018-03-02 DIAGNOSIS — Z79.01: ICD-10-CM

## 2018-03-02 DIAGNOSIS — Z79.899: ICD-10-CM

## 2018-03-02 LAB
ALBUMIN SERPL-MCNC: 3.8 GM/DL (ref 3.2–4.5)
ALP SERPL-CCNC: 56 U/L (ref 40–136)
ALT SERPL-CCNC: 10 U/L (ref 0–55)
BASOPHILS # BLD AUTO: 0.1 10^3/UL (ref 0–0.1)
BASOPHILS NFR BLD AUTO: 1 % (ref 0–10)
BILIRUB SERPL-MCNC: 0.4 MG/DL (ref 0.1–1)
BUN/CREAT SERPL: 29
CALCIUM SERPL-MCNC: 9.1 MG/DL (ref 8.5–10.1)
CHLORIDE SERPL-SCNC: 107 MMOL/L (ref 98–107)
CO2 SERPL-SCNC: 22 MMOL/L (ref 21–32)
CREAT SERPL-MCNC: 0.96 MG/DL (ref 0.6–1.3)
EOSINOPHIL # BLD AUTO: 0.3 10^3/UL (ref 0–0.3)
EOSINOPHIL NFR BLD AUTO: 5 % (ref 0–10)
ERYTHROCYTE [DISTWIDTH] IN BLOOD BY AUTOMATED COUNT: 15 % (ref 10–14.5)
GFR SERPLBLD BASED ON 1.73 SQ M-ARVRAT: 55 ML/MIN
GLUCOSE SERPL-MCNC: 93 MG/DL (ref 70–105)
HCT VFR BLD CALC: 42 % (ref 35–52)
HGB BLD-MCNC: 13.8 G/DL (ref 11.5–16)
LYMPHOCYTES # BLD AUTO: 1.5 X 10^3 (ref 1–4)
LYMPHOCYTES NFR BLD AUTO: 21 % (ref 12–44)
MANUAL DIFFERENTIAL PERFORMED BLD QL: NO
MCH RBC QN AUTO: 28 PG (ref 25–34)
MCHC RBC AUTO-ENTMCNC: 33 G/DL (ref 32–36)
MCV RBC AUTO: 86 FL (ref 80–99)
MONOCYTES # BLD AUTO: 0.6 X 10^3 (ref 0–1)
MONOCYTES NFR BLD AUTO: 8 % (ref 0–12)
NEUTROPHILS # BLD AUTO: 4.6 X 10^3 (ref 1.8–7.8)
NEUTROPHILS NFR BLD AUTO: 66 % (ref 42–75)
PLATELET # BLD: 295 10^3/UL (ref 130–400)
PMV BLD AUTO: 10.7 FL (ref 7.4–10.4)
POTASSIUM SERPL-SCNC: 4.2 MMOL/L (ref 3.6–5)
PROT SERPL-MCNC: 6.5 GM/DL (ref 6.4–8.2)
RBC # BLD AUTO: 4.87 10^6/UL (ref 4.35–5.85)
SODIUM SERPL-SCNC: 141 MMOL/L (ref 135–145)
WBC # BLD AUTO: 7.1 10^3/UL (ref 4.3–11)

## 2018-03-02 PROCEDURE — 85240 CLOT FACTOR VIII AHG 1 STAGE: CPT

## 2018-03-02 PROCEDURE — 99214 OFFICE O/P EST MOD 30 MIN: CPT

## 2018-03-02 PROCEDURE — 85307 ASSAY ACTIVATED PROTEIN C: CPT

## 2018-03-02 PROCEDURE — 85610 PROTHROMBIN TIME: CPT

## 2018-03-02 PROCEDURE — 85705 THROMBOPLASTIN INHIBITION: CPT

## 2018-03-02 PROCEDURE — 85730 THROMBOPLASTIN TIME PARTIAL: CPT

## 2018-03-02 PROCEDURE — 85613 RUSSELL VIPER VENOM DILUTED: CPT

## 2018-03-02 PROCEDURE — 85025 COMPLETE CBC W/AUTO DIFF WBC: CPT

## 2018-03-02 PROCEDURE — 99213 OFFICE O/P EST LOW 20 MIN: CPT

## 2018-03-02 PROCEDURE — 85335 FACTOR INHIBITOR TEST: CPT

## 2018-03-02 PROCEDURE — 85379 FIBRIN DEGRADATION QUANT: CPT

## 2018-03-02 PROCEDURE — 36415 COLL VENOUS BLD VENIPUNCTURE: CPT

## 2018-03-02 PROCEDURE — 80053 COMPREHEN METABOLIC PANEL: CPT

## 2018-03-02 PROCEDURE — 83615 LACTATE (LD) (LDH) ENZYME: CPT

## 2018-03-02 PROCEDURE — 86146 BETA-2 GLYCOPROTEIN ANTIBODY: CPT

## 2018-03-02 PROCEDURE — 86147 CARDIOLIPIN ANTIBODY EA IG: CPT
